# Patient Record
Sex: MALE | Race: WHITE | URBAN - NONMETROPOLITAN AREA
[De-identification: names, ages, dates, MRNs, and addresses within clinical notes are randomized per-mention and may not be internally consistent; named-entity substitution may affect disease eponyms.]

---

## 2022-12-30 ENCOUNTER — OUT OF OFFICE VISIT (OUTPATIENT)
Dept: URBAN - NONMETROPOLITAN AREA MEDICAL CENTER 3 | Facility: MEDICAL CENTER | Age: 82
End: 2022-12-30
Payer: MEDICARE

## 2022-12-30 DIAGNOSIS — D62 ABLA (ACUTE BLOOD LOSS ANEMIA): ICD-10-CM

## 2022-12-30 DIAGNOSIS — K57.32 CECAL DIVERTICULITIS: ICD-10-CM

## 2022-12-30 DIAGNOSIS — K92.1 ACUTE MELENA: ICD-10-CM

## 2022-12-30 PROCEDURE — 99205 OFFICE O/P NEW HI 60 MIN: CPT | Performed by: PHYSICIAN ASSISTANT

## 2022-12-31 ENCOUNTER — OUT OF OFFICE VISIT (OUTPATIENT)
Dept: URBAN - NONMETROPOLITAN AREA MEDICAL CENTER 3 | Facility: MEDICAL CENTER | Age: 82
End: 2022-12-31
Payer: MEDICARE

## 2022-12-31 DIAGNOSIS — K57.32 CECAL DIVERTICULITIS: ICD-10-CM

## 2022-12-31 DIAGNOSIS — K92.1 ACUTE MELENA: ICD-10-CM

## 2022-12-31 DIAGNOSIS — D62 ABLA (ACUTE BLOOD LOSS ANEMIA): ICD-10-CM

## 2022-12-31 PROCEDURE — 99214 OFFICE O/P EST MOD 30 MIN: CPT | Performed by: PHYSICIAN ASSISTANT

## 2023-01-26 ENCOUNTER — OFFICE VISIT (OUTPATIENT)
Dept: URBAN - NONMETROPOLITAN AREA CLINIC 4 | Facility: CLINIC | Age: 83
End: 2023-01-26

## 2023-02-02 ENCOUNTER — OFFICE VISIT (OUTPATIENT)
Dept: URBAN - NONMETROPOLITAN AREA CLINIC 4 | Facility: CLINIC | Age: 83
End: 2023-02-02
Payer: MEDICARE

## 2023-02-02 ENCOUNTER — WEB ENCOUNTER (OUTPATIENT)
Dept: URBAN - NONMETROPOLITAN AREA CLINIC 4 | Facility: CLINIC | Age: 83
End: 2023-02-02

## 2023-02-02 ENCOUNTER — LAB OUTSIDE AN ENCOUNTER (OUTPATIENT)
Dept: URBAN - NONMETROPOLITAN AREA CLINIC 4 | Facility: CLINIC | Age: 83
End: 2023-02-02

## 2023-02-02 VITALS
BODY MASS INDEX: 34.66 KG/M2 | HEART RATE: 86 BPM | HEIGHT: 69 IN | DIASTOLIC BLOOD PRESSURE: 64 MMHG | SYSTOLIC BLOOD PRESSURE: 143 MMHG | TEMPERATURE: 97.7 F | WEIGHT: 234 LBS

## 2023-02-02 DIAGNOSIS — D62 ACUTE BLOOD LOSS ANEMIA: ICD-10-CM

## 2023-02-02 DIAGNOSIS — D50.9 IRON DEFICIENCY ANEMIA, UNSPECIFIED IRON DEFICIENCY ANEMIA TYPE: ICD-10-CM

## 2023-02-02 DIAGNOSIS — K62.5 RECTAL BLEEDING: ICD-10-CM

## 2023-02-02 PROCEDURE — 99214 OFFICE O/P EST MOD 30 MIN: CPT | Performed by: PHYSICIAN ASSISTANT

## 2023-02-02 RX ORDER — ALLOPURINOL 300 MG/1
1 TABLET TABLET ORAL ONCE A DAY
Status: ACTIVE | COMMUNITY

## 2023-02-02 RX ORDER — PANTOPRAZOLE SODIUM 40 MG/1
1 TABLET TABLET, DELAYED RELEASE ORAL ONCE A DAY
Status: ACTIVE | COMMUNITY

## 2023-02-02 RX ORDER — SIMVASTATIN 20 MG
1 TABLET IN THE EVENING TABLET ORAL ONCE A DAY
Status: ACTIVE | COMMUNITY

## 2023-02-02 RX ORDER — HYDROCODONE BITARTRATE AND ACETAMINOPHEN 5; 325 MG/1; MG/1
1 TABLET AS NEEDED TABLET ORAL
Status: ACTIVE | COMMUNITY

## 2023-02-02 RX ORDER — SODIUM, POTASSIUM,MAG SULFATES 17.5-3.13G
177ML SOLUTION, RECONSTITUTED, ORAL ORAL
Qty: 1 | Refills: 0 | OUTPATIENT
Start: 2023-02-02 | End: 2023-02-04

## 2023-02-02 RX ORDER — GABAPENTIN 300 MG/1
1 CAPSULE CAPSULE ORAL ONCE A DAY
Status: ACTIVE | COMMUNITY

## 2023-02-02 NOTE — PHYSICAL EXAM GASTROINTESTINAL
Abdomen , soft, nontender, nondistended , no guarding or rigidity , no masses palpable , excessive abdominal fat.

## 2023-02-02 NOTE — HPI-TODAY'S VISIT:
Joshua Monaco Sr. Is a 82-year-old male patient with PMH of COPD, DM, HTN, HLD, JAK, CKD stage III, who was seen at [facility] ED on 12/30 due to abnormal lab value.  Patient was seen in the ED on 12/23 for rectal bleeding.  Hemoglobin at that time around baseline and mild leukocytosis.  CT scan performed that shows: Possible uncomplicated diverticulitis in the sigmoid colon. No active GI bleeding identified. Patient not sent home on antibiotics at that time and diagnosed with hemorrhoids noted on exam. Patient states after discharge, he had large volumes of rectal bleeding.  Patient states his bleeding initially began as dark stool and denies oral iron or Pepto-Bismol use.  Patient states that then progressed to maroon color blood followed by bright red blood.  Denies abdominal pain throughout rectal bleeding.  Patient now feeling generally weak and was seen by his PCP yesterday where he received lab work and noted to have a drop in his hemoglobin to 8.  Patient was then referred to the ED and presents today.   Lab work here in the ED shows WBCs 11.2, hemoglobin 7.9, .9, platelets 280k.  Sodium 137, potassium 4.4.  BUN 18.0, creatinine 1.70.  AST 17, ALT 14, T bili 0.20, alk phos 69.  Anemia panel shows low iron and iron saturation.  Ferritin low at 33.8.  Patient denies previous EGD.  He reports 1 previous colonoscopy approximately 10 years ago that he states was WNL.  No records on file.  Reports smokeless tobacco use.  Former smoking tobacco user.  Denies alcohol and drug use.  Denies previous history of chemoradiation.  Denies family history of IBD.

## 2023-02-15 ENCOUNTER — LAB OUTSIDE AN ENCOUNTER (OUTPATIENT)
Dept: URBAN - NONMETROPOLITAN AREA CLINIC 4 | Facility: CLINIC | Age: 83
End: 2023-02-15

## 2023-02-15 PROBLEM — 267530009: Status: ACTIVE | Noted: 2023-02-15

## 2023-02-15 PROBLEM — 87522002: Status: ACTIVE | Noted: 2023-02-15

## 2023-02-28 ENCOUNTER — OFFICE VISIT (OUTPATIENT)
Dept: URBAN - METROPOLITAN AREA SURGERY CENTER 14 | Facility: SURGERY CENTER | Age: 83
End: 2023-02-28

## 2023-02-28 RX ORDER — ALLOPURINOL 300 MG/1
1 TABLET TABLET ORAL ONCE A DAY
Status: ACTIVE | COMMUNITY

## 2023-02-28 RX ORDER — GABAPENTIN 300 MG/1
1 CAPSULE CAPSULE ORAL ONCE A DAY
Status: ACTIVE | COMMUNITY

## 2023-02-28 RX ORDER — PANTOPRAZOLE SODIUM 40 MG/1
1 TABLET TABLET, DELAYED RELEASE ORAL ONCE A DAY
Status: ACTIVE | COMMUNITY

## 2023-02-28 RX ORDER — HYDROCODONE BITARTRATE AND ACETAMINOPHEN 5; 325 MG/1; MG/1
1 TABLET AS NEEDED TABLET ORAL
Status: ACTIVE | COMMUNITY

## 2023-02-28 RX ORDER — SIMVASTATIN 20 MG
1 TABLET IN THE EVENING TABLET ORAL ONCE A DAY
Status: ACTIVE | COMMUNITY

## 2023-05-11 ENCOUNTER — OFFICE VISIT (OUTPATIENT)
Dept: URBAN - NONMETROPOLITAN AREA CLINIC 4 | Facility: CLINIC | Age: 83
End: 2023-05-11

## 2023-08-03 ENCOUNTER — OFFICE VISIT (OUTPATIENT)
Dept: URBAN - NONMETROPOLITAN AREA CLINIC 4 | Facility: CLINIC | Age: 83
End: 2023-08-03
Payer: MEDICARE

## 2023-08-03 ENCOUNTER — LAB OUTSIDE AN ENCOUNTER (OUTPATIENT)
Dept: URBAN - NONMETROPOLITAN AREA CLINIC 4 | Facility: CLINIC | Age: 83
End: 2023-08-03

## 2023-08-03 ENCOUNTER — WEB ENCOUNTER (OUTPATIENT)
Dept: URBAN - NONMETROPOLITAN AREA CLINIC 4 | Facility: CLINIC | Age: 83
End: 2023-08-03

## 2023-08-03 VITALS
HEIGHT: 69 IN | WEIGHT: 227.8 LBS | SYSTOLIC BLOOD PRESSURE: 138 MMHG | HEART RATE: 93 BPM | DIASTOLIC BLOOD PRESSURE: 67 MMHG | BODY MASS INDEX: 33.74 KG/M2 | TEMPERATURE: 98.1 F

## 2023-08-03 DIAGNOSIS — K62.5 RECTAL BLEEDING: ICD-10-CM

## 2023-08-03 DIAGNOSIS — D50.9 IRON DEFICIENCY ANEMIA, UNSPECIFIED IRON DEFICIENCY ANEMIA TYPE: ICD-10-CM

## 2023-08-03 PROCEDURE — 99213 OFFICE O/P EST LOW 20 MIN: CPT | Performed by: INTERNAL MEDICINE

## 2023-08-03 RX ORDER — ALLOPURINOL 300 MG/1
1 TABLET TABLET ORAL ONCE A DAY
Status: ACTIVE | COMMUNITY

## 2023-08-03 RX ORDER — SODIUM PICOSULFATE, MAGNESIUM OXIDE, AND ANHYDROUS CITRIC ACID 10; 3.5; 12 MG/160ML; G/160ML; G/160ML
160 ML THE FIRST DOSE THE EVENING BEFORE AND SECOND DOSE THE MORNING OF COLONOSCOPY LIQUID ORAL ONCE A DAY
Qty: 320 | OUTPATIENT
Start: 2023-08-03 | End: 2023-08-05

## 2023-08-03 RX ORDER — PANTOPRAZOLE SODIUM 40 MG/1
1 TABLET TABLET, DELAYED RELEASE ORAL ONCE A DAY
Status: ACTIVE | COMMUNITY

## 2023-08-03 RX ORDER — HYDROCODONE BITARTRATE AND ACETAMINOPHEN 5; 325 MG/1; MG/1
1 TABLET AS NEEDED TABLET ORAL
Status: ACTIVE | COMMUNITY

## 2023-08-03 RX ORDER — DOXAZOSIN MESYLATE 2 MG/1
1 TABLET TABLET ORAL ONCE A DAY
Status: ACTIVE | COMMUNITY

## 2023-08-03 RX ORDER — SPIRONOLACTONE 25 MG/1
1 TABLET TABLET, FILM COATED ORAL
Status: ACTIVE | COMMUNITY

## 2023-08-03 RX ORDER — SITAGLIPTIN 50 MG/1
AS DIRECTED TABLET, FILM COATED ORAL
Status: ACTIVE | COMMUNITY

## 2023-08-03 RX ORDER — GABAPENTIN 300 MG/1
1 CAPSULE CAPSULE ORAL ONCE A DAY
Status: ACTIVE | COMMUNITY

## 2023-08-03 RX ORDER — SIMVASTATIN 20 MG
1 TABLET IN THE EVENING TABLET ORAL ONCE A DAY
Status: ACTIVE | COMMUNITY

## 2023-08-03 NOTE — HPI-TODAY'S VISIT:
Joshua Monaco Sr. Is a 82-year-old male patient with PMH of COPD, DM, HTN, HLD, JAK, CKD stage III, who was seen at [facility] ED on 12/30 due to abnormal lab value.  Patient was seen in the ED on 12/23 for rectal bleeding.  Hemoglobin at that time around baseline and mild leukocytosis.  CT scan performed that shows: Possible uncomplicated diverticulitis in the sigmoid colon. No active GI bleeding identified. Patient not sent home on antibiotics at that time and diagnosed with hemorrhoids noted on exam. Patient states after discharge, he had large volumes of rectal bleeding.  Patient states his bleeding initially began as dark stool and denies oral iron or Pepto-Bismol use.  Patient states that then progressed to maroon color blood followed by bright red blood.  Denies abdominal pain throughout rectal bleeding.  Patient now feeling generally weak and was seen by his PCP yesterday where he received lab work and noted to have a drop in his hemoglobin to 8.  Patient was then referred to the ED and presents today.   Lab work here in the ED shows WBCs 11.2, hemoglobin 7.9, .9, platelets 280k.  Sodium 137, potassium 4.4.  BUN 18.0, creatinine 1.70.  AST 17, ALT 14, T bili 0.20, alk phos 69.  Anemia panel shows low iron and iron saturation.  Ferritin low at 33.8.  Patient denies previous EGD.  He reports 1 previous colonoscopy approximately 10 years ago that he states was WNL.  No records on file.  Reports smokeless tobacco use.  Former smoking tobacco user.  Denies alcohol and drug use.  Denies previous history of chemoradiation.  Denies family history of IBD.  8-3-23 Pt reports that he is taking otc meds for constipation takes mom which helps with constipation Pt reports that he is having 3 bm/week Pt reports that he does not take the pain meds only taking gabapentin had diverticulitis  Pt reports that he was prepared for c scope and was advised that he could Due for rp  had abla

## 2023-09-05 ENCOUNTER — OFFICE VISIT (OUTPATIENT)
Dept: URBAN - METROPOLITAN AREA MEDICAL CENTER 24 | Facility: MEDICAL CENTER | Age: 83
End: 2023-09-05
Payer: MEDICARE

## 2023-09-05 DIAGNOSIS — Z12.11 COLON CANCER SCREENING: ICD-10-CM

## 2023-09-05 DIAGNOSIS — K31.89 ACHYLIA: ICD-10-CM

## 2023-09-05 DIAGNOSIS — D50.9 ANEMIA: ICD-10-CM

## 2023-09-05 DIAGNOSIS — K22.70 BARRETT ESOPHAGUS: ICD-10-CM

## 2023-09-05 PROCEDURE — G0121 COLON CA SCRN NOT HI RSK IND: HCPCS | Performed by: INTERNAL MEDICINE

## 2023-09-05 PROCEDURE — 43239 EGD BIOPSY SINGLE/MULTIPLE: CPT | Performed by: INTERNAL MEDICINE

## 2023-10-06 ENCOUNTER — OFFICE VISIT (OUTPATIENT)
Dept: URBAN - METROPOLITAN AREA CLINIC 82 | Facility: CLINIC | Age: 83
End: 2023-10-06

## 2023-10-18 ENCOUNTER — DASHBOARD ENCOUNTERS (OUTPATIENT)
Age: 83
End: 2023-10-18

## 2023-10-23 ENCOUNTER — OFFICE VISIT (OUTPATIENT)
Dept: URBAN - NONMETROPOLITAN AREA CLINIC 4 | Facility: CLINIC | Age: 83
End: 2023-10-23

## 2023-10-23 RX ORDER — HYDROCODONE BITARTRATE AND ACETAMINOPHEN 5; 325 MG/1; MG/1
1 TABLET AS NEEDED TABLET ORAL
COMMUNITY

## 2023-10-23 RX ORDER — DOXAZOSIN MESYLATE 2 MG/1
1 TABLET TABLET ORAL ONCE A DAY
COMMUNITY

## 2023-10-23 RX ORDER — SIMVASTATIN 20 MG
1 TABLET IN THE EVENING TABLET ORAL ONCE A DAY
COMMUNITY

## 2023-10-23 RX ORDER — GABAPENTIN 300 MG/1
1 CAPSULE CAPSULE ORAL ONCE A DAY
COMMUNITY

## 2023-10-23 RX ORDER — ALLOPURINOL 300 MG/1
1 TABLET TABLET ORAL ONCE A DAY
COMMUNITY

## 2023-10-23 RX ORDER — SITAGLIPTIN 50 MG/1
AS DIRECTED TABLET, FILM COATED ORAL
COMMUNITY

## 2023-10-23 RX ORDER — SPIRONOLACTONE 25 MG/1
1 TABLET TABLET, FILM COATED ORAL
COMMUNITY

## 2023-10-23 RX ORDER — PANTOPRAZOLE SODIUM 40 MG/1
1 TABLET TABLET, DELAYED RELEASE ORAL ONCE A DAY
COMMUNITY